# Patient Record
Sex: FEMALE | Race: BLACK OR AFRICAN AMERICAN
[De-identification: names, ages, dates, MRNs, and addresses within clinical notes are randomized per-mention and may not be internally consistent; named-entity substitution may affect disease eponyms.]

---

## 2018-05-28 ENCOUNTER — HOSPITAL ENCOUNTER (EMERGENCY)
Dept: HOSPITAL 62 - ER | Age: 33
Discharge: HOME | End: 2018-05-28
Payer: SELF-PAY

## 2018-05-28 VITALS — SYSTOLIC BLOOD PRESSURE: 108 MMHG | DIASTOLIC BLOOD PRESSURE: 65 MMHG

## 2018-05-28 DIAGNOSIS — R19.7: ICD-10-CM

## 2018-05-28 DIAGNOSIS — R11.2: Primary | ICD-10-CM

## 2018-05-28 DIAGNOSIS — R10.9: ICD-10-CM

## 2018-05-28 PROCEDURE — 99283 EMERGENCY DEPT VISIT LOW MDM: CPT

## 2018-05-28 PROCEDURE — S0119 ONDANSETRON 4 MG: HCPCS

## 2018-05-28 NOTE — ER DOCUMENT REPORT
ED General





- General


Chief Complaint: Nausea/Vomiting/Diarrhea


Stated Complaint: VOMITING/DIARRHEA


Time Seen by Provider: 18 14:55


Mode of Arrival: Ambulatory


Information source: Patient


Notes: 





33-year-old female presents with complaints of nausea vomiting diarrhea since 

yesterday.  Patient notes she ate both pizza and Chinese food is unsure which 

will cause symptoms.  Denies any fevers or chills.  Notes she does get shaky 

intermittently


TRAVEL OUTSIDE OF THE [place] IN LAST 30 DAYS: No





- HPI


Onset: Yesterday


Onset/Duration: Sudden


Quality of pain: Cramping


Severity: Mild


Pain Level: 1


Associated symptoms: Diarrhea, Nausea, Vomiting


Exacerbated by: Denies


Relieved by: Denies


Similar symptoms previously: No


Recently seen / treated by doctor: No





- Related Data


Allergies/Adverse Reactions: 


 





Penicillins Allergy (Verified 18 14:17)


 











Past Medical History





- Social History


Smoking Status: Never Smoker


Cigarette use (# per day): No


Chew tobacco use (# tins/day): No


Smoking Education Provided: No


Frequency of alcohol use: None


Drug Abuse: None


Family History: Reviewed & Not Pertinent


Patient has suicidal ideation: No


Patient has homicidal ideation: No


Renal/ Medical History: Denies: Hx Peritoneal Dialysis


Past Surgical History: Reports: Hx  Section, Hx Orthopedic Surgery





Review of Systems





- Review of Systems


Notes: 





REVIEW OF SYSTEMS:


CONSTITUTIONAL :  Denies fever,  chills, or sweats.  Denies recent illness.


EENT:   Denies eye, ear, throat, or mouth pain or symptoms.  Denies nasal or 

sinus congestion or discharge.  Denies throat, tongue, or mouth swelling or 

difficulty swallowing.


CARDIOVASCULAR:  Denies chest pain.  Denies palpitations or racing or irregular 

heart beat.  Denies ankle edema.


RESPIRATORY:  Denies cough, cold, or chest congestion.  Denies shortness of 

breath, difficulty breathing, or wheezing.


GASTROINTESTINAL: Admits to abdominal cramping nausea vomiting diarrhea


GENITOURINARY:  Denies difficulty urinating, painful urination, burning, 

frequency, blood in urine, or discharge.


FEMALE  GENITOURINARY:  Denies vaginal bleeding, heavy or abnormal periods, 

irregular periods.  Denies vaginal discharge or odor. 


MUSCULOSKELETAL:  Denies back or neck pain or stiffness.  Denies joint pain or 

swelling.


SKIN:   Denies rash, lesions or sores.


HEMATOLOGIC :   Denies easy bruising or bleeding.


LYMPHATIC:  Denies swollen, enlarged glands.


NEUROLOGICAL:  Denies confusion or altered mental status.  Denies passing out 

or loss of consciousness.  Denies dizziness or lightheadedness.  Denies 

headache.  Denies weakness or paralysis or loss of use of either side.  Denies 

problems with gait or speech.  Denies sensory loss, numbness, or tingling.  

Denies seizures.


PSYCHIATRIC:  Denies anxiety or stress.  Denies depression, suicidal ideation, 

or homicidal ideation.





ALL OTHER SYSTEMS REVIEWED AND NEGATIVE.











PHYSICAL EXAMINATION:





GENERAL: Well-appearing, well-nourished and in no acute distress.





HEAD: Atraumatic, normocephalic.





EYES: Pupils equal round and reactive to light, extraocular movements intact, 

conjunctiva are normal.





ENT: Nares patent, oropharynx clear without exudates.  Moist mucous membranes.





NECK: Normal range of motion, supple without lymphadenopathy





LUNGS: Breath sounds clear to auscultation bilaterally and equal.  No wheezes 

rales or rhonchi.





HEART: Regular rate and rhythm without murmurs





ABDOMEN: Soft, generalized mild tender abdomen.  No guarding, no rebound.  No 

masses appreciated.





Female : deferred





Musculoskeletal: Normal range of motion, no pitting or edema.  No cyanosis.





NEUROLOGICAL: Cranial nerves grossly intact.  Normal speech, normal gait.  

Normal sensory, motor exams





PSYCH: Normal mood, normal affect.





SKIN: Warm, Dry, normal turgor, no rashes or lesions noted.

















Dictation was performed using Dragon voice recognition software





Physical Exam





- Vital signs


Vitals: 





 











Temp Pulse Resp BP Pulse Ox


 


 98.8 F   93   16   122/83   99 


 


 18 14:30  18 14:30  18 14:30  18 14:30  18 14:30














Course





- Re-evaluation


Re-evalutation: 





18 16:07


Patient's presentation is most consistent with a viral GI symptoms, patient was 

given nausea control Bentyl no symptoms have improved significantly I gave her 

a bottle of Gatorade which she drank with no difficulty will DC home with close 

follow-up








After performing a Medical Screening Examination, I estimate there is LOW risk 

for ACUTE APPENDICITIS, BOWEL OBSTRUCTION, ACUTE CHOLECYSTITIS, PERFORATED 

DIVERTICULITIS, INCARCERATED HERNIA, PANCREATITIS, PELVIC INFLAMMATORY DISEASE, 

PERFORATED ULCER, ECTOPIC PREGNANCY, or TUBO-OVARIAN ABSCESS, thus I consider 

the discharge disposition reasonable. Also, there is no evidence or peritonitis

, sepsis, or toxicity. I have reevaluated this patient multiple times and no 

significant life threatening changes are noted. The patient and I have 

discussed the diagnosis and risks, and we agree with discharging home with 

close follow-up with the understanding that symptoms and presentations can 

change. We also discussed returning to the Emergency Department immediately if 

new or worsening symptoms occur. We have discussed the symptoms which are most 

concerning (e.g., bloody stool, fever, changing or worsening pain, vomiting) 

that necessitate immediate return.





- Vital Signs


Vital signs: 





 











Temp Pulse Resp BP Pulse Ox


 


 98.8 F   93   16   122/83   99 


 


 18 14:30  18 14:30  18 14:30  18 14:30  18 14:30














Discharge





- Discharge


Clinical Impression: 


 Nausea vomiting and diarrhea





Condition: Stable


Disposition: HOME, SELF-CARE


Instructions:  Diarrhea, Nonspecific ([facility]), Vomiting ([facility])


Additional Instructions: 


Follow up with your physician tomorrow for further care or return to the ED 

IMMEDIATELY if symptoms worsen or new concerns occur. If you cannot afford to 

follow up with your primary care physician a list of low cost clinics have been 

provided at the end of your discharge papers as well.


Prescriptions: 


Dicyclomine HCl [Bentyl 20 mg Tablet] 20 mg PO QID #40 tablet


Promethazine HCl [Phenergan 25 mg Tablet] 1 - 2 tab PO Q6H PRN #15 tablet


 PRN Reason:

## 2019-07-10 ENCOUNTER — HOSPITAL ENCOUNTER (EMERGENCY)
Dept: HOSPITAL 62 - ER | Age: 34
Discharge: HOME | End: 2019-07-10
Payer: SELF-PAY

## 2019-07-10 VITALS — DIASTOLIC BLOOD PRESSURE: 91 MMHG | SYSTOLIC BLOOD PRESSURE: 148 MMHG

## 2019-07-10 DIAGNOSIS — F17.210: ICD-10-CM

## 2019-07-10 DIAGNOSIS — Z88.0: ICD-10-CM

## 2019-07-10 DIAGNOSIS — K02.9: Primary | ICD-10-CM

## 2019-07-10 PROCEDURE — 96372 THER/PROPH/DIAG INJ SC/IM: CPT

## 2019-07-10 PROCEDURE — 99406 BEHAV CHNG SMOKING 3-10 MIN: CPT

## 2019-07-10 PROCEDURE — 99282 EMERGENCY DEPT VISIT SF MDM: CPT

## 2019-07-10 NOTE — ER DOCUMENT REPORT
ED Oral Problem





- General


Chief Complaint: Toothache


Stated Complaint: MOUTH PAIN


Time Seen by Provider: 07/10/19 14:04


Mode of Arrival: Ambulatory


Information source: Patient


Notes: 





34-year-old female presented to ED for complaint of dental pain to the right 

lower and right upper jaw.  She states she has had these dental pain for over a 

month.  She states she was in by the dentist a month ago and they put on 

antibiotics.  She states she was then supposed to follow-up but she did not have

the money for the follow-up visit.  She states she called them today because the

pain was back and they told her to come to the emergency room for follow-up 

antibiotics and be seen in the office on .  She states she is having 

severe pain in the right upper and lower jaw tooth #32 and 5.


TRAVEL OUTSIDE OF THE [place] IN LAST 30 DAYS: No





- HPI


Patient complains to provider of: Toothache


Onset: Other


Quality of pain: Sharp, Throbbing


Severity: Moderate


Pain Level: 3


Associated symptoms: Toothache


Worsened by: Cold


Relieved by: Nothing


Similar symptoms previously: Yes


Recently seen / treated by doctor/dentist: Yes





- Related Data


Allergies/Adverse Reactions: 


                                        





fluconazole [From Diflucan] Allergy (Verified 07/10/19 12:11)


   


Penicillins Allergy (Verified 18 14:17)


   











Past Medical History





- General


Information source: Patient





- Social History


Smoking Status: Current Every Day Smoker


Cigarette use (# per day): Yes - 1/2 to 1 pack/day


Chew tobacco use (# tins/day): No


Smoking Education Provided: Yes - 4 minutes


Frequency of alcohol use: Occasional


Drug Abuse: None


Occupation: Lab tech


Lives with: Spouse/Significant other


Family History: Reviewed & Not Pertinent


Patient has suicidal ideation: No


Patient has homicidal ideation: No





- Past Medical History


Cardiac Medical History: Reports: None


Pulmonary Medical History: Reports: None


EENT Medical History: Reports: None


Neurological Medical History: Reports: None


Endocrine Medical History: Reports: None


Renal/ Medical History: Reports: None


Malignancy Medical History: Reports: None


GI Medical History: Reports: None


Musculoskeletal Medical History: Reports Hx Musculoskeletal Trauma


Skin Medical History: Reports None


Psychiatric Medical History: Reports: None


Traumatic Medical History: Reports: Hx Fractures - Right femur right ankle left 

arm


Infectious Medical History: Reports: None


Past Surgical History: Reports: Hx  Section, Hx Orthopedic Surgery





Review of Systems





- Review of Systems


Constitutional: No symptoms reported


EENT: No symptoms reported, Mouth pain, Dental problem


Cardiovascular: No symptoms reported


Respiratory: No symptoms reported


Gastrointestinal: No symptoms reported


Genitourinary: No symptoms reported


Female Genitourinary: No symptoms reported


Musculoskeletal: No symptoms reported


Skin: No symptoms reported


Hematologic/Lymphatic: No symptoms reported


Neurological/Psychological: No symptoms reported


-: Yes All other systems reviewed and negative





Physical Exam





- Vital signs


Vitals: 


                                        











Temp Pulse Resp BP Pulse Ox


 


 98.1 F   85   18   148/91 H  99 


 


 07/10/19 12:29  07/10/19 12:29  07/10/19 12:29  07/10/19 12:29  07/10/19 12:29











Interpretation: Normal





- General


General appearance: Appears well, Alert





- HEENT


Head: Normocephalic, Atraumatic


Eyes: Normal


Pupils: PERRL


Ears: Normal


External canal: Normal


Tympanic membrane: Normal


Sinus: Normal


Nasal: Normal


Mouth/Lips: Caries


Mucous membranes: Normal


Teeth diagram: 


                            __________________________














                            __________________________





 1 - Dental cavity





 2 - Dental cavity





Pharynx: Normal


Neck: Normal





- Respiratory


Respiratory status: No respiratory distress


Chest status: Nontender


Breath sounds: Normal


Chest palpation: Normal





- Cardiovascular


Rhythm: Regular


Heart sounds: Normal auscultation


Murmur: No





- Abdominal


Inspection: Normal


Distension: No distension


Bowel sounds: Normal


Tenderness: Nontender


Organomegaly: No organomegaly





- Back


Back: Normal, Nontender





- Extremities


General upper extremity: Normal inspection, Nontender, Normal color, Normal ROM,

Normal temperature


General lower extremity: Normal inspection, Nontender, Normal color, Normal ROM,

Normal temperature, Normal weight bearing.  No: Bjorn's sign





- Neurological


Neuro grossly intact: Yes


Cognition: Normal


Orientation: AAOx4


[place] Coma Scale Eye Opening: Spontaneous


[place] Coma Scale Verbal: Oriented


[place] Coma Scale Motor: Obeys Commands


[place] Coma Scale Total: 15


Speech: Normal


Motor strength normal: LUE, RUE, LLE, RLE


Sensory: Normal





- Psychological


Associated symptoms: Normal affect, Normal mood





- Skin


Skin Temperature: Warm


Skin Moisture: Dry


Skin Color: Normal





Course





- Re-evaluation


Re-evalutation: 





07/10/19 14:45


Presentation is most consistent with likely an infected tooth.  Airway is 

patent.  Vitals within normal limits.  Patient is able swallow without any 

difficulty.  There is no significant facial swelling.  No evidence of Hector 

angina, apical abscess, or airway obstruction.  Patient will be started on an

tibiotics.  I've instructed to follow-up with dentistry as earliest ability for 

definitive management.  At this time will discharge with return precautions and 

follow-up recommendations.  Verbal discharge instructions given a the bedside 

and opportunity for questions given. Medication warnings reviewed. Patient is in

agreement with this plan and has verbalized understanding of return precautions 

and the need for primary care follow-up in the next 24-72 hours.





- Vital Signs


Vital signs: 


                                        











Temp Pulse Resp BP Pulse Ox


 


 98.1 F   85   18   148/91 H  99 


 


 07/10/19 12:29  07/10/19 12:29  07/10/19 12:29  07/10/19 12:29  07/10/19 12:29














Discharge





- Discharge


Clinical Impression: 


 Pain due to dental caries





Condition: Stable


Disposition: HOME, SELF-CARE


Additional Instructions: 


TOOTHACHE:





     Your pain is due to dental decay.  The tooth must be repaired in order for 

you to feel better.  You will, therefore, be referred to a dentist.  We do not 

have dentists [facility]


     Severe swelling or drainage around a tooth usually means a dental abscess. 

This also requires evaluation and treatment by the dentist, but antibiotics may 

be prescribed while awaiting dental treatment.


     You should be rechecked immediately if you develop major swelling of the 

face, increasing pain, a lump in the jaw or gums, headache, difficulty 

swallowing, or fever.








CLINDAMYCIN:


     You have been given a prescription for the antibiotic clindamycin.  It is 

often prescribed for infections in the mouth, such as dental infections or 

abscesses, and for skin infections due to MRSA.  It's important that you take 

all the medication, unless instructed otherwise by your physician.  Failure to 

complete the entire course can result in relapse of your condition.


     Common side effects of antibiotics include nausea, intestinal cramping, or 

diarrhea.  Women may develop vaginal yeast infections, and babies can get yeast 

(thrush) in the mouth following the use of antibiotics.  Contact your physician 

if you develop significant side effects from this medication.


     Allergy to this antibiotic can result in hives, wheezing, faintness, or 

itching.  If symptoms of allergy occur, stop the medication and call the doctor.





Toradol Injection





     You have been given an injection of ketorolac tromethamine (Toradol).  This

is an excellent, safe drug for pain control.  It also has potent 

antiinflammatory action.  You should have significant pain relief within about 

one hour.


     Toradol is not addicting and is non-sedating.  It does not interfere with 

driving or work.


     Call or return if you develop itching, hives, shortness of breath, or rash.





Salt and soda solution





1 quart of water


1 tablespoon of salt


1 teaspoon of baking soda


Mixed 3 ingredients together and boil for 1 minute


Placed in a covered quart jar


Use 1/2 ounce of cold solution to gargle 3 times a day





FOLLOW-UP CARE:


     You have been referred for follow-up care to the dentists listed below.  

Call the dentists office for an appointment as you were instructed or within 

the next two days.  If you experience worsening or a significant change in your 

symptoms, notify the physician immediately or return to the Emergency Department

at any time for re-evaluation.





[facility]


[address] [place], [place]


[phone]





[facility]


[address] [place] [address] [phone]





[facility]


[address] [facility]


[phone]





[facility]


[address]


[facility]


[phone]





[facility]


[address] [facility]


[phone]


[email or web address]





[facility]


[address] [place]  [address] [phone]


Monday-  8:00am to 5:00 pm


Will see patients from other [facility].


Charges based on income and family size and


accepts Medicare, Medicaid, and Insurances


Will pull molars





[facility]


Student Clinics


[facility] [phone]


Hours of Operation


   8:00 am - 4:30 pm weekdays





The following dental offices accept Medicaid:





   [facility]   [phone]


   Dr. Diaz         [phone]


   Dr. Amezcua         [phone]


   Dr. Rojas         [phone]


   Dr. Chapa         [phone]


   [facility]


      oral surgery      [phone]


   Dr. Gonzalez ([place])      [phone]


   Dr. Alcantar ([place])   [phone]


   [facility]      [phone]


   [facility] ([place])   [phone]


   Dr. Dickinson ([place])      [phone]


   [facility]      [phone]


   [facility]   [phone]


   [facility]   [phone]


   Dr. Rodas ([place])      [phone]


   [facility] 


      ([place])      [phone]





   Medicaid Care Line      [phone]


Prescriptions: 


Clindamycin HCl 300 mg PO Q6 #40 capsule


Forms:  Elevated Blood Pressure, Smoking Cessation Education, Return to Work

## 2019-07-14 ENCOUNTER — HOSPITAL ENCOUNTER (EMERGENCY)
Dept: HOSPITAL 62 - ER | Age: 34
Discharge: HOME | End: 2019-07-14
Payer: SELF-PAY

## 2019-07-14 VITALS — SYSTOLIC BLOOD PRESSURE: 142 MMHG | DIASTOLIC BLOOD PRESSURE: 82 MMHG

## 2019-07-14 DIAGNOSIS — K08.89: Primary | ICD-10-CM

## 2019-07-14 DIAGNOSIS — F17.200: ICD-10-CM

## 2019-07-14 PROCEDURE — 99282 EMERGENCY DEPT VISIT SF MDM: CPT

## 2019-07-14 PROCEDURE — 96375 TX/PRO/DX INJ NEW DRUG ADDON: CPT

## 2019-07-14 PROCEDURE — 96374 THER/PROPH/DIAG INJ IV PUSH: CPT

## 2019-07-14 NOTE — ER DOCUMENT REPORT
HPI





- HPI


Time Seen by Provider: 19 04:39


Pain Level: 5


Notes: 





Patient is a 34-year-old female presented to the emergency department chief 

complaint of dental pain.  Patient reports pain to the right lower gumline at 

tooth #30, 31 and 32.  Patient reports she was seen here 4 days ago and started 

on clindamycin.  Patient reports she cannot get into see her dentist for 

approximately 2 weeks.  Patient reports she has been trying to manage her pain 

with ibuprofen and it is not relieving the pain.  She denies any fevers.











- CONSTITUTIONAL


Constitutional: DENIES: Fever, Chills





- REPRODUCTIVE


Reproductive: DENIES: Pregnant:





Past Medical History





- General


Information source: Patient





- Social History


Smoking Status: Current Every Day Smoker


Frequency of alcohol use: None


Drug Abuse: None


Family History: Reviewed & Not Pertinent


Patient has suicidal ideation: No


Patient has homicidal ideation: No


Renal/ Medical History: Denies: Hx Peritoneal Dialysis


Musculoskeletal Medical History: Reports Hx Musculoskeletal Trauma


Traumatic Medical History: Reports: Hx Fractures - Right femur right ankle left 

arm


Past Surgical History: Reports: Hx  Section, Hx Orthopedic Surgery





- Immunizations


Immunizations up to date: Yes





[employer] Provider Document





- CONSTITUTIONAL


Notes: 





PHYSICAL EXAMINATION:





GENERAL: Well-appearing, well-nourished and in no acute distress.





HEAD: Atraumatic, normocephalic.





EYES: Pupils equal round extraocular movements intact,  conjunctiva are normal.





ENT: Nares patent, 





NECK: Normal range of motion, Erythema noted around tooth #30, 31 and 32, no 

drainable abscess identified.





LUNGS: No respiratory distress





Musculoskeletal: Normal range of motion





NEUROLOGICAL:  Normal speech, normal gait. 





PSYCH: Normal mood, normal affect.





SKIN: Warm, Dry, normal turgor, no rashes or lesions noted.  





- INFECTION CONTROL


TRAVEL OUTSIDE OF THE [place] IN LAST 30 DAYS: No





Course





- Re-evaluation


Re-evalutation: 





19 05:38


Presentation is most consistent with likely an infected tooth.  Airway is 

patent.  Vitals within normal limits.  Patient is able swallow without any 

difficulty.  There is no significant facial swelling.  No evidence of Hector 

angina, apical abscess, or airway obstruction.  Patient will be started on 

antibiotics.  I've instructed to follow-up with dentistry as earliest ability 

for definitive management.  At this time will discharge with return precautions 

and follow-up recommendations.  Verbal discharge instructions given a the 

bedside and opportunity for questions given. Medication warnings reviewed. 

Patient is in agreement with this plan and has verbalized understanding of 

return precautions and the need for primary care follow-up in the next 24-72 

hours.











- Vital Signs


Vital signs: 


                                        











Temp Pulse Resp BP Pulse Ox


 


 97.8 F   83   16   152/95 H  100 


 


 19 03:42  19 03:42  19 03:42  19 03:42  19 03:42














Discharge





- Discharge


Clinical Impression: 


 Pain, dental





Condition: Stable


Disposition: HOME, SELF-CARE


Additional Instructions: 


You have been seen for dental pain.  It is very important that you follow-up 

with a dentist for definitive care.  Please return if you develop fever greater 

than 101, swelling in your face, vomiting, difficulty breathing or swallowing, 

or any other symptoms that are concerning to you.  For pain you should take 

ibuprofen 800 mg every 8 hours as needed, continue to take antibiotics as 

prescribed by previous provider.  Use the lidocaine jelly topically to the area 

up to 6 times daily.








If you are unable to get into see her dentist, you may call the following dental

clinic:





[facility]


[phone]





Forms:  Return to Work

## 2020-12-04 ENCOUNTER — HOSPITAL ENCOUNTER (EMERGENCY)
Dept: HOSPITAL 62 - ER | Age: 35
Discharge: HOME | End: 2020-12-04
Payer: SELF-PAY

## 2020-12-04 VITALS — DIASTOLIC BLOOD PRESSURE: 85 MMHG | SYSTOLIC BLOOD PRESSURE: 148 MMHG

## 2020-12-04 DIAGNOSIS — K08.89: ICD-10-CM

## 2020-12-04 DIAGNOSIS — K02.9: Primary | ICD-10-CM

## 2020-12-04 PROCEDURE — 99283 EMERGENCY DEPT VISIT LOW MDM: CPT

## 2020-12-04 NOTE — ER DOCUMENT REPORT
HPI





- HPI


Time Seen by Provider: 20 09:50


Context: 





Patient is a 35-year-old female presents emergency department with a chief 

complaint of teeth pain.  Patient states that she has a cracked tooth and 

decayed teeth with a history of bulimia.  Patient states that she has an 

appointment with her dentist, but is not until February.  Patient denies any 

shortness of breath or difficulty breathing.





- ROS


Systems Reviewed and Negative: Yes All other systems reviewed and negative





- CONSTITUTIONAL


Constitutional: DENIES: Fever, Chills





- EENT


EENT: DENIES: Sore Throat


Notes: 





See HPI.





- REPRODUCTIVE


Reproductive: DENIES: Pregnant:





- MUSCULOSKELETAL


Musculoskeletal: DENIES: Extremity pain





- DERM


Skin Color: Normal


Skin Problems: None





Past Medical History





- General


Information source: Patient





- Social History


Smoking Status: Unknown if Ever Smoked


Family History: Reviewed & Not Pertinent


Renal/ Medical History: Denies: Hx Peritoneal Dialysis


Musculoskeletal Medical History: Reports Hx Musculoskeletal Trauma


Traumatic Medical History: Reports: Hx Fractures - Right femur right ankle left 

arm


Past Surgical History: Reports: Hx  Section, Hx Orthopedic Surgery





- Immunizations


Immunizations up to date: Yes





Vertical Provider Document





- CONSTITUTIONAL


Agree With Documented VS: Yes


Exam Limitations: No Limitations


General Appearance: No Apparent Distress





- INFECTION CONTROL


TRAVEL OUTSIDE OF THE [place] IN LAST 30 DAYS: No





- HEENT


HEENT: Atraumatic, Normocephalic, PERRLA.  negative: Pharyngeal Erythema


Mouth Diagram: 


                            __________________________














                            __________________________





 1 - Dental caries noted.








- RESPIRATORY


Respiratory: Breath Sounds Normal, No Respiratory Distress





- CARDIOVASCULAR


Cardiovascular: Regular Rate, Regular Rhythm





- MUSCULOSKELETAL/EXTREMETIES


Musculoskeletal/Extremeties: FROM





- NEURO


Level of Consciousness: Awake, Alert, Appropriate


Motor/Sensory: No Motor Deficit, No Sensory Deficit





- DERM


Integumentary: Warm, Dry, No Rash





Course





- Re-evaluation


Re-evalutation: 





20 10:05


Patient's physical exam and history is most consistent with a infected tooth.  

Patient is able to swallow, no facial swelling noted, airway is patent, vital 

signs are normal.  I do not suspect Hector's angina, peritonsilar abscess, or 

airway obstruction.  The patient will be started on oral antibiotics.  I have 

given the patient education on their antibiotics.  Patient was given 

instructions to follow-up with a dentist this week.  Return precautions were 

given.  Verbal discharge instructions were given.  Patient verbalized 

understanding.  Patient is stable for discharge.  











- Vital Signs


Vital signs: 


                                        











Temp Pulse Resp BP Pulse Ox


 


 98.0 F   85   16   148/85 H  100 


 


 20 09:14  20 09:14  20 09:14  20 09:14  20 09:14














Discharge





- Discharge


Clinical Impression: 


 Toothache





Condition: Stable


Disposition: HOME, SELF-CARE


Instructions:  Clindamycin ([facility]), Toothache ([facility])


Additional Instructions: 


You have been seen in the emergency department for a toothache.  You may take 

ibuprofen 600 mg and Tylenol 1000 mg every 6 hours as needed for the pain.  You 

have also been prescribed antibiotics.  Please take the antibiotics as p

rescribed, even if you start to feel better.  If you develop a fever greater 

than 100.4 F, or have any symptoms that are worrisome to you, please return to 

the emergency department.  Please follow-up with a dentist this week in regards 

to your visit.


Prescriptions: 


Clindamycin HCl [Cleocin 150 mg Capsule] 300 mg PO Q6 7 Days #56 capsule


Forms:  Return to Work


Referrals: 


[facility] [Provider Group] - Follow up in 1 week